# Patient Record
Sex: FEMALE | Race: WHITE
[De-identification: names, ages, dates, MRNs, and addresses within clinical notes are randomized per-mention and may not be internally consistent; named-entity substitution may affect disease eponyms.]

---

## 2019-06-17 ENCOUNTER — HOSPITAL ENCOUNTER (INPATIENT)
Dept: HOSPITAL 35 - 4E | Age: 64
LOS: 4 days | Discharge: HOME | DRG: 371 | End: 2019-06-21
Attending: INTERNAL MEDICINE | Admitting: INTERNAL MEDICINE
Payer: COMMERCIAL

## 2019-06-17 VITALS — DIASTOLIC BLOOD PRESSURE: 72 MMHG | SYSTOLIC BLOOD PRESSURE: 105 MMHG

## 2019-06-17 VITALS — HEIGHT: 65 IN | BODY MASS INDEX: 19.99 KG/M2 | WEIGHT: 120 LBS

## 2019-06-17 VITALS — DIASTOLIC BLOOD PRESSURE: 66 MMHG | SYSTOLIC BLOOD PRESSURE: 120 MMHG

## 2019-06-17 VITALS — SYSTOLIC BLOOD PRESSURE: 65 MMHG | DIASTOLIC BLOOD PRESSURE: 45 MMHG

## 2019-06-17 DIAGNOSIS — F32.9: ICD-10-CM

## 2019-06-17 DIAGNOSIS — N73.9: ICD-10-CM

## 2019-06-17 DIAGNOSIS — K35.33: Primary | ICD-10-CM

## 2019-06-17 DIAGNOSIS — Z88.2: ICD-10-CM

## 2019-06-17 DIAGNOSIS — Z88.0: ICD-10-CM

## 2019-06-17 DIAGNOSIS — Z98.891: ICD-10-CM

## 2019-06-17 DIAGNOSIS — E43: ICD-10-CM

## 2019-06-17 LAB
ALBUMIN SERPL-MCNC: 2.9 G/DL (ref 3.4–5)
ALT SERPL-CCNC: 20 U/L (ref 30–65)
ANION GAP SERPL CALC-SCNC: 8 MMOL/L (ref 7–16)
ANISOCYTOSIS BLD QL SMEAR: (no result)
AST SERPL-CCNC: 16 U/L (ref 15–37)
BILIRUB SERPL-MCNC: 0.3 MG/DL
BILIRUB UR-MCNC: NEGATIVE MG/DL
BUN SERPL-MCNC: 8 MG/DL (ref 7–18)
CALCIUM SERPL-MCNC: 8.8 MG/DL (ref 8.5–10.1)
CHLORIDE SERPL-SCNC: 100 MMOL/L (ref 98–107)
CO2 SERPL-SCNC: 30 MMOL/L (ref 21–32)
COLOR UR: YELLOW
CREAT SERPL-MCNC: 0.6 MG/DL (ref 0.6–1)
EOSINOPHIL NFR BLD: 1 % (ref 0–3)
ERYTHROCYTE [DISTWIDTH] IN BLOOD BY AUTOMATED COUNT: 13.2 % (ref 10.5–14.5)
GLUCOSE SERPL-MCNC: 84 MG/DL (ref 74–106)
GRANULOCYTES NFR BLD MANUAL: 76 % (ref 36–66)
HCT VFR BLD CALC: 36.8 % (ref 37–47)
HGB BLD-MCNC: 12.2 GM/DL (ref 12–15)
KETONES UR STRIP-MCNC: (no result) MG/DL
LYMPHOCYTES NFR BLD AUTO: 10 % (ref 24–44)
MCH RBC QN AUTO: 30.9 PG (ref 26–34)
MCHC RBC AUTO-ENTMCNC: 33.1 G/DL (ref 28–37)
MCV RBC: 93.2 FL (ref 80–100)
MONOCYTES NFR BLD: 10 % (ref 1–8)
NEUTROPHILS # BLD: 6.9 THOU/UL (ref 1.4–8.2)
NEUTS BAND NFR BLD: 2 % (ref 0–8)
PLATELET # BLD: 355 THOU/UL (ref 150–400)
POLYCHROMASIA BLD QL SMEAR: (no result)
POTASSIUM SERPL-SCNC: 3.8 MMOL/L (ref 3.5–5.1)
PROT SERPL-MCNC: 7.2 G/DL (ref 6.4–8.2)
RBC # BLD AUTO: 3.95 MIL/UL (ref 4.2–5)
RBC # UR STRIP: NEGATIVE /UL
SODIUM SERPL-SCNC: 138 MMOL/L (ref 136–145)
SP GR UR STRIP: <= 1.005 (ref 1–1.03)
URINE CLARITY: CLEAR
URINE GLUCOSE-RANDOM*: NEGATIVE
URINE LEUKOCYTES-REFLEX: NEGATIVE
URINE NITRITE-REFLEX: NEGATIVE
URINE PROTEIN (DIPSTICK): (no result)
UROBILINOGEN UR STRIP-ACNC: 0.2 E.U./DL (ref 0.2–1)
VARIANT LYMPHS NFR BLD MANUAL: 1 %
WBC # BLD AUTO: 8.8 THOU/UL (ref 4–11)

## 2019-06-17 PROCEDURE — 10783: CPT

## 2019-06-17 PROCEDURE — 27000 TENOTOMY ADDUCTOR HIP PERQ: CPT

## 2019-06-17 NOTE — NUR
Pt direct admit from Dr. Conway office at approx 1500. A&o x4. Up ad shari in
room. IVF infusing. NPO. Drinking oral contrast for CT scan. Admission
completed. Pt states she does not take any perscription medication at home.
Dr. Sebastian consulted. Call light within reach. Will continue to monitor.

## 2019-06-18 VITALS — DIASTOLIC BLOOD PRESSURE: 52 MMHG | SYSTOLIC BLOOD PRESSURE: 112 MMHG

## 2019-06-18 VITALS — DIASTOLIC BLOOD PRESSURE: 49 MMHG | SYSTOLIC BLOOD PRESSURE: 113 MMHG

## 2019-06-18 VITALS — DIASTOLIC BLOOD PRESSURE: 72 MMHG | SYSTOLIC BLOOD PRESSURE: 116 MMHG

## 2019-06-18 VITALS — SYSTOLIC BLOOD PRESSURE: 118 MMHG | DIASTOLIC BLOOD PRESSURE: 68 MMHG

## 2019-06-18 VITALS — SYSTOLIC BLOOD PRESSURE: 95 MMHG | DIASTOLIC BLOOD PRESSURE: 42 MMHG

## 2019-06-18 VITALS — SYSTOLIC BLOOD PRESSURE: 119 MMHG | DIASTOLIC BLOOD PRESSURE: 72 MMHG

## 2019-06-18 VITALS — SYSTOLIC BLOOD PRESSURE: 98 MMHG | DIASTOLIC BLOOD PRESSURE: 46 MMHG

## 2019-06-18 VITALS — SYSTOLIC BLOOD PRESSURE: 101 MMHG | DIASTOLIC BLOOD PRESSURE: 49 MMHG

## 2019-06-18 VITALS — SYSTOLIC BLOOD PRESSURE: 104 MMHG | DIASTOLIC BLOOD PRESSURE: 52 MMHG

## 2019-06-18 VITALS — DIASTOLIC BLOOD PRESSURE: 68 MMHG | SYSTOLIC BLOOD PRESSURE: 120 MMHG

## 2019-06-18 VITALS — DIASTOLIC BLOOD PRESSURE: 45 MMHG | SYSTOLIC BLOOD PRESSURE: 103 MMHG

## 2019-06-18 VITALS — DIASTOLIC BLOOD PRESSURE: 44 MMHG | SYSTOLIC BLOOD PRESSURE: 99 MMHG

## 2019-06-18 VITALS — DIASTOLIC BLOOD PRESSURE: 45 MMHG | SYSTOLIC BLOOD PRESSURE: 99 MMHG

## 2019-06-18 VITALS — DIASTOLIC BLOOD PRESSURE: 42 MMHG | SYSTOLIC BLOOD PRESSURE: 91 MMHG

## 2019-06-18 LAB
ALBUMIN SERPL-MCNC: 2.3 G/DL (ref 3.4–5)
ALT SERPL-CCNC: 15 U/L (ref 30–65)
ANION GAP SERPL CALC-SCNC: 10 MMOL/L (ref 7–16)
ANISOCYTOSIS BLD QL SMEAR: SLIGHT
AST SERPL-CCNC: 16 U/L (ref 15–37)
BASOPHILS NFR BLD AUTO: 0 % (ref 0–2)
BILIRUB SERPL-MCNC: 0.3 MG/DL
BUN SERPL-MCNC: 5 MG/DL (ref 7–18)
CALCIUM SERPL-MCNC: 8.1 MG/DL (ref 8.5–10.1)
CHLORIDE SERPL-SCNC: 104 MMOL/L (ref 98–107)
CO2 SERPL-SCNC: 25 MMOL/L (ref 21–32)
CREAT SERPL-MCNC: 0.6 MG/DL (ref 0.6–1)
EOSINOPHIL NFR BLD: 1 % (ref 0–3)
ERYTHROCYTE [DISTWIDTH] IN BLOOD BY AUTOMATED COUNT: 13.2 % (ref 10.5–14.5)
GLUCOSE SERPL-MCNC: 64 MG/DL (ref 74–106)
GRANULOCYTES NFR BLD MANUAL: 70 % (ref 36–66)
HCT VFR BLD CALC: 33.8 % (ref 37–47)
HGB BLD-MCNC: 11.4 GM/DL (ref 12–15)
INR PPP: 1.1
LYMPHOCYTES NFR BLD AUTO: 16 % (ref 24–44)
MCH RBC QN AUTO: 31.3 PG (ref 26–34)
MCHC RBC AUTO-ENTMCNC: 33.7 G/DL (ref 28–37)
MCV RBC: 92.9 FL (ref 80–100)
MONOCYTES NFR BLD: 13 % (ref 1–8)
NEUTROPHILS # BLD: 5.3 THOU/UL (ref 1.4–8.2)
NEUTS BAND NFR BLD: 0 % (ref 0–8)
PLATELET # BLD: 300 THOU/UL (ref 150–400)
POTASSIUM SERPL-SCNC: 3.7 MMOL/L (ref 3.5–5.1)
PROT SERPL-MCNC: 5.9 G/DL (ref 6.4–8.2)
PROTHROMBIN TIME: 12 SECONDS (ref 9.3–11.4)
RBC # BLD AUTO: 3.64 MIL/UL (ref 4.2–5)
SODIUM SERPL-SCNC: 139 MMOL/L (ref 136–145)
WBC # BLD AUTO: 7.5 THOU/UL (ref 4–11)

## 2019-06-18 PROCEDURE — 0W9J3ZZ DRAINAGE OF PELVIC CAVITY, PERCUTANEOUS APPROACH: ICD-10-PCS

## 2019-06-18 PROCEDURE — 02HV33Z INSERTION OF INFUSION DEVICE INTO SUPERIOR VENA CAVA, PERCUTANEOUS APPROACH: ICD-10-PCS | Performed by: INTERNAL MEDICINE

## 2019-06-18 NOTE — NUR
Assumed care of pt at 0700. Pt a&ox4. Up ad shari in room. IVF and antibiotics
infusing. Awaiting on IR procedure. Pain controlled. Call light within reach.

## 2019-06-18 NOTE — NUR
Chart reviewed and case discussed with the care team. Cm role introduced at
bedside. Pt is a&ox4 and living independently prior to admission. She works
fulltime and has health insurance through her employer. Her dtrs Ambreen and
Rebecca are her primary contacts. Ambreen lives locally. Pt awaiting IR
procedure for draining abcess. Will follow for possible iv atb at dc pending
the plan of care. The pt is agreeable if needed. Support provided.

## 2019-06-19 VITALS — DIASTOLIC BLOOD PRESSURE: 37 MMHG | SYSTOLIC BLOOD PRESSURE: 111 MMHG

## 2019-06-19 VITALS — SYSTOLIC BLOOD PRESSURE: 104 MMHG | DIASTOLIC BLOOD PRESSURE: 48 MMHG

## 2019-06-19 VITALS — SYSTOLIC BLOOD PRESSURE: 127 MMHG | DIASTOLIC BLOOD PRESSURE: 52 MMHG

## 2019-06-19 VITALS — SYSTOLIC BLOOD PRESSURE: 100 MMHG | DIASTOLIC BLOOD PRESSURE: 51 MMHG

## 2019-06-19 NOTE — NUR
ASSUMED PT CARE 1900. PT ALERT AND ORIENTED. REASSESSMENT COMPLETE. VSS. IV
DRESSING C/D/I, NO SIGNS OF INFILTRATION. DENEIS PAIN, AND N/V. FLUSHED ABD
DRAIN, MONITORING DRAINAGE. CALL LIGHT AND PERSONAL BELONINGS WITHIN REACH.
WILL CONTINUE POC UNTIL EOS.

## 2019-06-19 NOTE — NUR
RECEIVED WORD BACK FROM [facility] [place] [facility] THAT PT % COVERAGE FOR DRUG
AND SUPPLIES IF IV ABX NEEDED FOR HOME.

## 2019-06-19 NOTE — NUR
FAXED REFERRAL TO [facility] TO HAVE THEM CHECK ON IV ABX COVERAGE IN
CASE SHE NEEDS HOME IV ABX.  WILL AWAIT COVERAGE INFORMATION.

## 2019-06-19 NOTE — NUR
Assumed pt care at 7am.Pt in bed very pleasant and involved in her care.
Assessment completed.vss.Breakfast ordered and given.Notified intervention
radiology about supplying drainage bag to abdominal abscess.Made several calls
before on of the rn came up to attach the bag.Received call from Dr Conway,
updates given.Meds given as ordered.Will continue to monitor.

## 2019-06-20 VITALS — SYSTOLIC BLOOD PRESSURE: 102 MMHG | DIASTOLIC BLOOD PRESSURE: 56 MMHG

## 2019-06-20 VITALS — SYSTOLIC BLOOD PRESSURE: 114 MMHG | DIASTOLIC BLOOD PRESSURE: 69 MMHG

## 2019-06-20 VITALS — SYSTOLIC BLOOD PRESSURE: 105 MMHG | DIASTOLIC BLOOD PRESSURE: 56 MMHG

## 2019-06-20 VITALS — DIASTOLIC BLOOD PRESSURE: 56 MMHG | SYSTOLIC BLOOD PRESSURE: 110 MMHG

## 2019-06-20 LAB
ANION GAP SERPL CALC-SCNC: 7 MMOL/L (ref 7–16)
BUN SERPL-MCNC: 6 MG/DL (ref 7–18)
CALCIUM SERPL-MCNC: 7.7 MG/DL (ref 8.5–10.1)
CHLORIDE SERPL-SCNC: 108 MMOL/L (ref 98–107)
CO2 SERPL-SCNC: 26 MMOL/L (ref 21–32)
CREAT SERPL-MCNC: 0.6 MG/DL (ref 0.6–1)
GLUCOSE SERPL-MCNC: 100 MG/DL (ref 74–106)
POTASSIUM SERPL-SCNC: 4.7 MMOL/L (ref 3.5–5.1)
SODIUM SERPL-SCNC: 141 MMOL/L (ref 136–145)

## 2019-06-20 NOTE — NUR
ASSUMED PT CARE 1900. PT ALERT AND ORIENTED. REASSESSMENT COMPLETE. VSS. IV
DRESSING C/D/I. DRAINAGE BAG IN PLACE. MINIMAL OUTPUT. PT DENIES PAIN, DENIES
N/V. CALL LIGHT WITHIN REACH. WILL CONTINUE POC UNTIL EOS.

## 2019-06-20 NOTE — NUR
Assumed pt care at 7am.Pt in and out of room ambulating in hallways.Dr Conway
here order noted.Pt tolerated meds and diet.New piv placed today due to
infiltration.Ascess drain flushed with saline as ordered.Warm moist applied to
rt ac.Will continue to monitor.

## 2019-06-21 VITALS — SYSTOLIC BLOOD PRESSURE: 98 MMHG | DIASTOLIC BLOOD PRESSURE: 53 MMHG

## 2019-06-21 VITALS — DIASTOLIC BLOOD PRESSURE: 53 MMHG | SYSTOLIC BLOOD PRESSURE: 98 MMHG

## 2019-06-21 VITALS — SYSTOLIC BLOOD PRESSURE: 107 MMHG | DIASTOLIC BLOOD PRESSURE: 57 MMHG

## 2019-06-21 LAB
ALBUMIN SERPL-MCNC: 1.9 G/DL (ref 3.4–5)
ALT SERPL-CCNC: 18 U/L (ref 30–65)
ANION GAP SERPL CALC-SCNC: 6 MMOL/L (ref 7–16)
AST SERPL-CCNC: 31 U/L (ref 15–37)
BASOPHILS NFR BLD AUTO: 0.6 % (ref 0–2)
BILIRUB SERPL-MCNC: 0.1 MG/DL
BUN SERPL-MCNC: 4 MG/DL (ref 7–18)
CALCIUM SERPL-MCNC: 8 MG/DL (ref 8.5–10.1)
CHLORIDE SERPL-SCNC: 106 MMOL/L (ref 98–107)
CO2 SERPL-SCNC: 27 MMOL/L (ref 21–32)
CREAT SERPL-MCNC: 0.7 MG/DL (ref 0.6–1)
EOSINOPHIL NFR BLD: 2.6 % (ref 0–3)
ERYTHROCYTE [DISTWIDTH] IN BLOOD BY AUTOMATED COUNT: 13.3 % (ref 10.5–14.5)
GLUCOSE SERPL-MCNC: 114 MG/DL (ref 74–106)
GRANULOCYTES NFR BLD MANUAL: 65.4 % (ref 36–66)
HCT VFR BLD CALC: 34.4 % (ref 37–47)
HGB BLD-MCNC: 11.4 GM/DL (ref 12–15)
INR PPP: 1.2
LYMPHOCYTES NFR BLD AUTO: 20.2 % (ref 24–44)
MCH RBC QN AUTO: 30.8 PG (ref 26–34)
MCHC RBC AUTO-ENTMCNC: 33.3 G/DL (ref 28–37)
MCV RBC: 92.6 FL (ref 80–100)
MONOCYTES NFR BLD: 11.2 % (ref 1–8)
NEUTROPHILS # BLD: 2.6 THOU/UL (ref 1.4–8.2)
PLATELET # BLD: 415 THOU/UL (ref 150–400)
POTASSIUM SERPL-SCNC: 4 MMOL/L (ref 3.5–5.1)
PROT SERPL-MCNC: 5.4 G/DL (ref 6.4–8.2)
PROTHROMBIN TIME: 12.9 SECONDS (ref 9.3–11.4)
RBC # BLD AUTO: 3.71 MIL/UL (ref 4.2–5)
SODIUM SERPL-SCNC: 139 MMOL/L (ref 136–145)
WBC # BLD AUTO: 3.9 THOU/UL (ref 4–11)

## 2019-06-21 NOTE — NUR
Assumed pt care at 7am.Pt kept npo for ct abd for f/u abdominal abscess drain.
Left around 10 and returned to floor 2 hours later.Assessment completed.vss.
Pt ambulated in hallways several times.Dr Moreno here,order noted.Received
call from Dr Conway,updates given.Iv team notified about picc line.Consent
signed and picc line placed.Pt will be dc home later this evening or early in
am.Pt was concerned about tenderness to her abdomen.No redness noted after
examine.Will continue to monitor.

## 2019-06-21 NOTE — NUR
Dr lancaster here later this evening.Dc order noted.Picc line placed by iv team
earlier this afternoon.First dose of antibiotic given and no reactions noted.
Dc summary compiled and reviewed with pt.Saline [facility].At 1900,pt dc home
ambulatory accompanied by transporter.

## 2019-06-21 NOTE — NUR
ASSUMED PT CARE 1900. PT ALERT AND ORIENTED. REASSESSMENT COMPLETE. VSS. IV
DRESSING C/D/I. PT DENIES N/V. REPORTS MINIMAL PAIN. CALL LIGHT AND PERSONAL
BELONIGNS WITHIN REACH. WILL CONTINUE POC UNTIL EOS.

## 2019-06-21 NOTE — NUR
VASCULAR ACCESS CONSULTED FOR PICC FOR HOME ABX. PT'S LABS,MEDS,HISTORY,ORDER
AND CONSENT VERIFIED. DISCUSSED BENEFITS AND RISKS WITH PT,VERBALIZED
UNDERSTANDING.  PT WAS PREPPED AND DRAPED FOR MAX BARRIER PRECAUTIONS. BRENDEN
BRACHIAL WAS WIDELY PATENT WITH USG, 1% LIDOCAINE GIVEN SQ. 4FR SL PICC
TRIMMED TO 45CM INSERTED TO 1CM EXTERNAL. PICC SECURED STAT CXR ORDERED.
WALLET CARD GIVEN TO PT.

## 2019-06-21 NOTE — NUR
RECEIVED ORDERS THIS AFTERNOON TO ARRANGE IV ABX FOR PT TO DC HOME TODAY.
[facility] AND THEY ARE ABLE TO ACCEPT FOR IV ABX.
[facility] HERE TO DO TEACHING ONCE PICC LINE IS IN PLACE.  HOME IV ABX FAXED TO
[facility].  AWAITING FINAL ORDERS FROM DR ANDRES.

## 2019-06-27 ENCOUNTER — HOSPITAL ENCOUNTER (OUTPATIENT)
Dept: HOSPITAL 35 - CAT | Age: 64
End: 2019-06-27
Attending: INTERNAL MEDICINE
Payer: COMMERCIAL

## 2019-06-27 DIAGNOSIS — K65.1: ICD-10-CM

## 2019-06-27 DIAGNOSIS — M12.88: ICD-10-CM

## 2019-06-27 DIAGNOSIS — K56.41: ICD-10-CM

## 2019-06-27 DIAGNOSIS — I70.0: Primary | ICD-10-CM

## 2019-06-27 DIAGNOSIS — M51.37: ICD-10-CM

## 2019-06-27 DIAGNOSIS — R91.1: ICD-10-CM

## 2019-07-17 ENCOUNTER — HOSPITAL ENCOUNTER (OUTPATIENT)
Dept: HOSPITAL 35 - CAT | Age: 64
End: 2019-07-17
Attending: SURGERY
Payer: COMMERCIAL

## 2019-07-17 DIAGNOSIS — K42.9: Primary | ICD-10-CM

## 2019-07-17 DIAGNOSIS — K56.41: ICD-10-CM

## 2019-07-17 DIAGNOSIS — K52.9: ICD-10-CM

## 2019-07-17 DIAGNOSIS — K35.33: ICD-10-CM

## 2019-07-26 ENCOUNTER — HOSPITAL ENCOUNTER (OUTPATIENT)
Dept: HOSPITAL 35 - TBA | Age: 64
LOS: 1 days | Discharge: HOME | End: 2019-07-27
Attending: SURGERY
Payer: COMMERCIAL

## 2019-07-26 VITALS — DIASTOLIC BLOOD PRESSURE: 61 MMHG | SYSTOLIC BLOOD PRESSURE: 119 MMHG

## 2019-07-26 VITALS — SYSTOLIC BLOOD PRESSURE: 115 MMHG | DIASTOLIC BLOOD PRESSURE: 66 MMHG

## 2019-07-26 VITALS — HEIGHT: 65 IN | WEIGHT: 108 LBS | BODY MASS INDEX: 17.99 KG/M2

## 2019-07-26 VITALS — SYSTOLIC BLOOD PRESSURE: 89 MMHG | DIASTOLIC BLOOD PRESSURE: 52 MMHG

## 2019-07-26 VITALS — SYSTOLIC BLOOD PRESSURE: 55 MMHG | DIASTOLIC BLOOD PRESSURE: 52 MMHG

## 2019-07-26 DIAGNOSIS — Z79.899: ICD-10-CM

## 2019-07-26 DIAGNOSIS — Z98.890: ICD-10-CM

## 2019-07-26 DIAGNOSIS — F41.9: ICD-10-CM

## 2019-07-26 DIAGNOSIS — Z88.2: ICD-10-CM

## 2019-07-26 DIAGNOSIS — Z88.8: ICD-10-CM

## 2019-07-26 DIAGNOSIS — K35.33: Primary | ICD-10-CM

## 2019-07-26 DIAGNOSIS — F32.9: ICD-10-CM

## 2019-07-26 DIAGNOSIS — Z88.0: ICD-10-CM

## 2019-07-26 LAB
HCT VFR BLD CALC: 44.6 % (ref 37–47)
HGB BLD-MCNC: 14.7 GM/DL (ref 12–15)

## 2019-07-26 PROCEDURE — 50555 KIDNEY ENDOSCOPY & BIOPSY: CPT

## 2019-07-26 PROCEDURE — 10783: CPT

## 2019-07-26 PROCEDURE — 70005: CPT

## 2019-07-26 PROCEDURE — 50243: CPT

## 2019-07-26 PROCEDURE — 62110: CPT

## 2019-07-26 PROCEDURE — 50010 RENAL EXPLORATION: CPT

## 2019-07-26 PROCEDURE — 51489: CPT

## 2019-07-26 PROCEDURE — 53312: CPT

## 2019-07-26 PROCEDURE — 50101: CPT

## 2019-07-26 PROCEDURE — 50411: CPT

## 2019-07-26 PROCEDURE — 56525: CPT

## 2019-07-26 PROCEDURE — 53310: CPT

## 2019-07-26 PROCEDURE — 56526: CPT

## 2019-07-26 PROCEDURE — 62900: CPT

## 2019-07-26 PROCEDURE — 50246: CPT

## 2019-07-26 PROCEDURE — 53307: CPT

## 2019-07-26 PROCEDURE — 53335: CPT

## 2019-07-26 PROCEDURE — 50558: CPT

## 2019-07-27 VITALS — DIASTOLIC BLOOD PRESSURE: 53 MMHG | SYSTOLIC BLOOD PRESSURE: 96 MMHG

## 2019-07-27 NOTE — NUR
ASSUMED CARE OF PT @1900. PT A&OX4. ARRIVED ON THE FLOOR @1620 FROM [facility]. PAIN
MEDS GIVEN FOR MANAGEMENT SEE EMAR. ABX GIVEN AND FLUIDS INFUSING. POC DONE
AND WILL CONTINUE TO MONITOR TILL EOS

## 2019-07-27 NOTE — NUR
DISCHARGED TO HOME WITH DAUGHTER IN PRIVATE VEHICLE.  DENIES PAIN.  TOLERATED
REGULAR DIET FOR BREAKFAST AND LUNCH.  SKIN WARM AND DRY. LAP SITES CLEAN AND
DRY WITH [facility]ANDA[place] INTACT.  VERBALIZES UNDERSTANDING OF DISCHARGE INSRUCTIONS.

## 2022-01-27 ENCOUNTER — HOSPITAL ENCOUNTER (OUTPATIENT)
Dept: HOSPITAL 35 - CAT | Age: 67
End: 2022-01-27
Attending: INTERNAL MEDICINE
Payer: COMMERCIAL

## 2022-01-27 DIAGNOSIS — E78.00: ICD-10-CM

## 2022-01-27 DIAGNOSIS — Z13.6: Primary | ICD-10-CM

## 2022-01-27 DIAGNOSIS — I25.10: ICD-10-CM
